# Patient Record
(demographics unavailable — no encounter records)

---

## 2025-01-06 NOTE — PHYSICAL EXAM
[Alert] : alert [Normal Voice/Communication] : normal voice/communication [Healthy Appearing] : healthy appearing [No Acute Distress] : no acute distress [Sclera] : the sclera and conjunctiva were normal [Hearing Threshold Finger Rub Not [place]] : hearing was normal [Normal Lips/Gums] : the lips and gums were normal [Normal Appearance] : the appearance of the neck was normal [No Respiratory Distress] : no respiratory distress [No Acc Muscle Use] : no accessory muscle use [Respiration, Rhythm And Depth] : normal respiratory rhythm and effort [Auscultation Breath Sounds / Voice Sounds] : lungs were clear to auscultation bilaterally [Heart Rate And Rhythm] : heart rate was normal and rhythm regular [Normal S1, S2] : normal S1 and S2 [Bowel Sounds] : normal bowel sounds [Abdomen Tenderness] : non-tender [No Masses] : no abdominal mass palpated [Abdomen Soft] : soft [] : no hepatosplenomegaly [Oriented To Time, Place, And Person] : oriented to person, place, and time

## 2025-01-06 NOTE — ASSESSMENT
[FreeTextEntry1] : Patient is a 70 year old male, with PMH of HLD, who was referred by cardiology for evaluation of elevated LFTs while on statins. There is documentation stating patient has anemia, however, there is no comment on his Hgb or any other results to review.   Elevated LFTs - Will order U/S and extensive liver workup to r/o underlying etiology including viral and autoimmune hepatitis.   Possible anemia? - Discussed colonoscopy but pt is apprehensive and deferred colonoscopy at this time - He is asking about alternatives, we discussed the option of virtual colonography, however, I would want to review results of labs before performing alternative screening modalities. - will update labs including anemia workup  RTC in 2 months to review U/S and labs, if anemic, will re-discuss the endoscopic evaluation

## 2025-01-06 NOTE — HISTORY OF PRESENT ILLNESS
[FreeTextEntry1] : Patient is a 70 year old male, with PMH of HLD, who was referred by cardiology for evaluation of elevated LFTs while on statins.   Pt is a poor historian. He does not know why he is here today but the referral states elevated LFTs and anemia. Pt states he did not know he had anemia.   LFTs on progress note from cardiology showed in July 2024 ALT 62, Alk Phos 136, albumin 3.4. No other labs are documented. Pt is asymptomatic from GI perspective. He denies NSAID use.   He had a colonoscopy many years ago, unsure of the results. He denies a family h/o colon cancer or polyps. No melena. He states his BMs are normal, daily.   Patient denies any significant cardiac or pulmonary conditions.   Patient denies pyrosis, dysphagia, abdominal pain, change in BMs, rectal bleeding, nausea, vomiting, or unexplained weight loss.

## 2025-01-28 NOTE — PHYSICAL EXAM
[Alert] : alert [Normal Voice/Communication] : normal voice/communication [Healthy Appearing] : healthy appearing [No Acute Distress] : no acute distress [Sclera] : the sclera and conjunctiva were normal [Hearing Threshold Finger Rub Not [place]] : hearing was normal [Normal Lips/Gums] : the lips and gums were normal [Oropharynx] : the oropharynx was normal [Normal Appearance] : the appearance of the neck was normal [No Neck Mass] : no neck mass was observed [No Respiratory Distress] : no respiratory distress [No Acc Muscle Use] : no accessory muscle use [Respiration, Rhythm And Depth] : normal respiratory rhythm and effort [Auscultation Breath Sounds / Voice Sounds] : lungs were clear to auscultation bilaterally [Heart Rate And Rhythm] : heart rate was normal and rhythm regular [Normal S1, S2] : normal S1 and S2 [Murmurs] : no murmurs [None] : no edema [Bowel Sounds] : normal bowel sounds [Abdomen Tenderness] : non-tender [No Masses] : no abdominal mass palpated [Abdomen Soft] : soft [] : no hepatosplenomegaly [Normal Sphincter Tone] : normal sphincter tone [No Rectal Mass] : no rectal mass [Occult Blood] : negative occult blood [Oriented To Time, Place, And Person] : oriented to person, place, and time [de-identified] : Tall, thin, no acute distress.  Well-developed well-nourished. [FreeTextEntry1] : Anicteric sclera. [de-identified] : Moist mucosa.  No pharyngitis.  Mallampati #1. [de-identified] : Supple.  No adenopathy. [de-identified] : Clear to A&P. [de-identified] : No MRG. [de-identified] : No cyanosis or clubbing. [de-identified] : Flat soft bowel sounds present.  No organomegaly.  No mass tenderness or rebound.  No scars.  No hernias. [de-identified] : Small external hemorrhoids at the 12 and 6:00 positions.  Inactive.  Fibrotic.  Nontender.  Prostate +2.  Moderately enlarged.  Nontender.  No dominant nodule. [de-identified] : Normal. [de-identified] : Normal. [de-identified] : Normal. [de-identified] : Normal.

## 2025-01-28 NOTE — HISTORY OF PRESENT ILLNESS
[FreeTextEntry1] : Distant colonoscopy greater than 10 years ago alleged negative.  Patient cannot recall provider.

## 2025-01-28 NOTE — ASSESSMENT
[FreeTextEntry1] : No evidence of anemia. No evidence of abnormal liver function tests.  All LFTs are normal.  Hepatitis profiles and autoimmune panels are negative.  No evidence for celiac disease or iron deficiency anemia. Negative family history for colorectal neoplasia.  No recent colonoscopy.  As such patient is at average risk for development of colorectal neoplasia.  Therefore a screening colonoscopy was offered to the patient.  The procedure, its risk benefits and prep, were explained to the patient, who understands and is agreeable to proceed.  ASA #2.  Mallampati #1.  Gavilyte prep to be utilized.  No clearances required.  Blood pressure medication to be taken on a.m. of procedure.  Patient appears to be in optimal medical condition to undergo planned procedure.  Arrangements made.  Results to follow.

## 2025-01-28 NOTE — REASON FOR VISIT
[Follow-up] : a follow-up of an existing diagnosis [FreeTextEntry1] : Check blood work, screening colonoscopy

## 2025-04-24 NOTE — PHYSICAL EXAM
[Alert] : alert [Normal Voice/Communication] : normal voice/communication [Healthy Appearing] : healthy appearing [No Acute Distress] : no acute distress [Sclera] : the sclera and conjunctiva were normal [Hearing Threshold Finger Rub Not [place]] : hearing was normal [Normal Lips/Gums] : the lips and gums were normal [Oropharynx] : the oropharynx was normal [Normal Appearance] : the appearance of the neck was normal [No Neck Mass] : no neck mass was observed [No Respiratory Distress] : no respiratory distress [No Acc Muscle Use] : no accessory muscle use [Respiration, Rhythm And Depth] : normal respiratory rhythm and effort [Auscultation Breath Sounds / Voice Sounds] : lungs were clear to auscultation bilaterally [Heart Rate And Rhythm] : heart rate was normal and rhythm regular [Normal S1, S2] : normal S1 and S2 [Murmurs] : no murmurs [None] : no edema [Bowel Sounds] : normal bowel sounds [Abdomen Tenderness] : non-tender [No Masses] : no abdominal mass palpated [Abdomen Soft] : soft [] : no hepatosplenomegaly [Normal Sphincter Tone] : normal sphincter tone [No Rectal Mass] : no rectal mass [Oriented To Time, Place, And Person] : oriented to person, place, and time [Occult Blood] : negative occult blood [de-identified] : Tall, thin, no acute distress.  Well-developed well-nourished. [de-identified] : Moist mucosa.  No pharyngitis.  Mallampati #1. [FreeTextEntry1] : Anicteric sclera. [de-identified] : Supple.  No adenopathy. [de-identified] : Clear to A&P. [de-identified] : No MRG. [de-identified] : No cyanosis or clubbing. [de-identified] : Small external hemorrhoids at the 12 and 6:00 positions.  Inactive.  Fibrotic.  Nontender.  Prostate +2.  Moderately enlarged.  Nontender.  No dominant nodule. [de-identified] : Flat soft bowel sounds present.  No organomegaly.  No mass tenderness or rebound.  No scars.  No hernias. [de-identified] : Normal. [de-identified] : Normal. [de-identified] : Normal. [de-identified] : Normal.

## 2025-04-24 NOTE — ASSESSMENT
[FreeTextEntry1] : Average risk for development of colorectal neoplasia.  Recent blood work unremarkable.  Family history negative for colorectal neoplasia.  Distant colonoscopy greater than 10 years ago negative.  Completed gavel light prep. Appropriate candidate.  ASA #2.  Optimized.  Consented.